# Patient Record
Sex: FEMALE | Race: BLACK OR AFRICAN AMERICAN | NOT HISPANIC OR LATINO | URBAN - METROPOLITAN AREA
[De-identification: names, ages, dates, MRNs, and addresses within clinical notes are randomized per-mention and may not be internally consistent; named-entity substitution may affect disease eponyms.]

---

## 2021-11-16 ENCOUNTER — LAB VISIT (OUTPATIENT)
Dept: LAB | Facility: OTHER | Age: 51
End: 2021-11-16
Attending: INTERNAL MEDICINE
Payer: MEDICAID

## 2021-11-16 DIAGNOSIS — Z80.0 FAMILY HISTORY OF COLON CANCER: ICD-10-CM

## 2021-11-16 DIAGNOSIS — R10.11 ABDOMINAL PAIN, RIGHT UPPER QUADRANT: Primary | ICD-10-CM

## 2021-11-16 DIAGNOSIS — Z12.11 SPECIAL SCREENING FOR MALIGNANT NEOPLASMS, COLON: ICD-10-CM

## 2021-11-16 LAB
ALBUMIN SERPL BCP-MCNC: 3.7 G/DL (ref 3.5–5.2)
ALP SERPL-CCNC: 90 U/L (ref 55–135)
ALT SERPL W/O P-5'-P-CCNC: 21 U/L (ref 10–44)
AMYLASE SERPL-CCNC: 61 U/L (ref 20–110)
ANION GAP SERPL CALC-SCNC: 6 MMOL/L (ref 8–16)
AST SERPL-CCNC: 24 U/L (ref 10–40)
BASOPHILS # BLD AUTO: 0.07 K/UL (ref 0–0.2)
BASOPHILS NFR BLD: 0.7 % (ref 0–1.9)
BILIRUB SERPL-MCNC: 0.5 MG/DL (ref 0.1–1)
BUN SERPL-MCNC: 12 MG/DL (ref 6–20)
CALCIUM SERPL-MCNC: 9.3 MG/DL (ref 8.7–10.5)
CHLORIDE SERPL-SCNC: 108 MMOL/L (ref 95–110)
CO2 SERPL-SCNC: 28 MMOL/L (ref 23–29)
CREAT SERPL-MCNC: 0.8 MG/DL (ref 0.5–1.4)
DIFFERENTIAL METHOD: ABNORMAL
EOSINOPHIL # BLD AUTO: 0.4 K/UL (ref 0–0.5)
EOSINOPHIL NFR BLD: 4 % (ref 0–8)
ERYTHROCYTE [DISTWIDTH] IN BLOOD BY AUTOMATED COUNT: 11.9 % (ref 11.5–14.5)
EST. GFR  (AFRICAN AMERICAN): >60 ML/MIN/1.73 M^2
EST. GFR  (NON AFRICAN AMERICAN): >60 ML/MIN/1.73 M^2
GLUCOSE SERPL-MCNC: 94 MG/DL (ref 70–110)
HCT VFR BLD AUTO: 42.1 % (ref 37–48.5)
HGB BLD-MCNC: 13.5 G/DL (ref 12–16)
IMM GRANULOCYTES # BLD AUTO: 0.02 K/UL (ref 0–0.04)
IMM GRANULOCYTES NFR BLD AUTO: 0.2 % (ref 0–0.5)
LIPASE SERPL-CCNC: 25 U/L (ref 4–60)
LYMPHOCYTES # BLD AUTO: 3.8 K/UL (ref 1–4.8)
LYMPHOCYTES NFR BLD: 37.1 % (ref 18–48)
MCH RBC QN AUTO: 31 PG (ref 27–31)
MCHC RBC AUTO-ENTMCNC: 32.1 G/DL (ref 32–36)
MCV RBC AUTO: 97 FL (ref 82–98)
MONOCYTES # BLD AUTO: 1.1 K/UL (ref 0.3–1)
MONOCYTES NFR BLD: 11.2 % (ref 4–15)
NEUTROPHILS # BLD AUTO: 4.8 K/UL (ref 1.8–7.7)
NEUTROPHILS NFR BLD: 46.8 % (ref 38–73)
NRBC BLD-RTO: 0 /100 WBC
PLATELET # BLD AUTO: 302 K/UL (ref 150–450)
PMV BLD AUTO: 9.7 FL (ref 9.2–12.9)
POTASSIUM SERPL-SCNC: 4.1 MMOL/L (ref 3.5–5.1)
PROT SERPL-MCNC: 6.8 G/DL (ref 6–8.4)
RBC # BLD AUTO: 4.35 M/UL (ref 4–5.4)
SODIUM SERPL-SCNC: 142 MMOL/L (ref 136–145)
WBC # BLD AUTO: 10.2 K/UL (ref 3.9–12.7)

## 2021-11-16 PROCEDURE — 36415 COLL VENOUS BLD VENIPUNCTURE: CPT | Performed by: INTERNAL MEDICINE

## 2021-11-16 PROCEDURE — 83690 ASSAY OF LIPASE: CPT | Performed by: INTERNAL MEDICINE

## 2021-11-16 PROCEDURE — 85025 COMPLETE CBC W/AUTO DIFF WBC: CPT | Performed by: INTERNAL MEDICINE

## 2021-11-16 PROCEDURE — 80053 COMPREHEN METABOLIC PANEL: CPT | Performed by: INTERNAL MEDICINE

## 2021-11-16 PROCEDURE — 82150 ASSAY OF AMYLASE: CPT | Performed by: INTERNAL MEDICINE

## 2022-12-16 ENCOUNTER — HOSPITAL ENCOUNTER (EMERGENCY)
Facility: OTHER | Age: 52
Discharge: HOME | End: 2022-12-16
Attending: EMERGENCY MEDICINE
Payer: MEDICAID

## 2022-12-16 VITALS
BODY MASS INDEX: 33.59 KG/M2 | HEIGHT: 66 IN | WEIGHT: 209 LBS | RESPIRATION RATE: 18 BRPM | SYSTOLIC BLOOD PRESSURE: 170 MMHG | HEART RATE: 50 BPM | TEMPERATURE: 98 F | DIASTOLIC BLOOD PRESSURE: 80 MMHG | OXYGEN SATURATION: 100 %

## 2022-12-16 DIAGNOSIS — R51.9 NONINTRACTABLE HEADACHE, UNSPECIFIED CHRONICITY PATTERN, UNSPECIFIED HEADACHE TYPE: ICD-10-CM
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
## 2022-12-16 DIAGNOSIS — S16.1XXA CERVICAL STRAIN, ACUTE, INITIAL ENCOUNTER: ICD-10-CM

## 2022-12-16 PROCEDURE — 99284 EMERGENCY DEPT VISIT MOD MDM: CPT

## 2022-12-16 PROCEDURE — 63600175 PHARM REV CODE 636 W HCPCS: Performed by: EMERGENCY MEDICINE

## 2022-12-16 PROCEDURE — 96372 THER/PROPH/DIAG INJ SC/IM: CPT | Performed by: EMERGENCY MEDICINE

## 2022-12-16 RX ORDER — ORPHENADRINE CITRATE 30 MG/ML
60 INJECTION INTRAMUSCULAR; INTRAVENOUS
Status: COMPLETED | OUTPATIENT
Start: 2022-12-16 | End: 2022-12-16

## 2022-12-16 RX ORDER — IBUPROFEN 800 MG/1
800 TABLET ORAL EVERY 6 HOURS PRN
Qty: 30 TABLET | Refills: 0 | Status: SHIPPED | OUTPATIENT
Start: 2022-12-16

## 2022-12-16 RX ORDER — KETOROLAC TROMETHAMINE 30 MG/ML
30 INJECTION, SOLUTION INTRAMUSCULAR; INTRAVENOUS
Status: COMPLETED | OUTPATIENT
Start: 2022-12-16 | End: 2022-12-16

## 2022-12-16 RX ORDER — CYCLOBENZAPRINE HCL 10 MG
10 TABLET ORAL 3 TIMES DAILY PRN
Qty: 20 TABLET | Refills: 0 | Status: SHIPPED | OUTPATIENT
Start: 2022-12-16 | End: 2022-12-26

## 2022-12-16 RX ADMIN — ORPHENADRINE CITRATE 60 MG: 30 INJECTION INTRAMUSCULAR; INTRAVENOUS at 01:12

## 2022-12-16 RX ADMIN — KETOROLAC TROMETHAMINE 30 MG: 30 INJECTION, SOLUTION INTRAMUSCULAR; INTRAVENOUS at 01:12
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
## 2023-10-25 ENCOUNTER — HOSPITAL ENCOUNTER (EMERGENCY)
Facility: OTHER | Age: 53
Discharge: HOME | End: 2023-10-25
Attending: EMERGENCY MEDICINE
Payer: MEDICAID

## 2023-10-25 VITALS
OXYGEN SATURATION: 99 % | TEMPERATURE: 98 F | RESPIRATION RATE: 18 BRPM | WEIGHT: 211 LBS | HEIGHT: 66 IN | HEART RATE: 50 BPM | BODY MASS INDEX: 33.91 KG/M2 | DIASTOLIC BLOOD PRESSURE: 83 MMHG | SYSTOLIC BLOOD PRESSURE: 173 MMHG

## 2023-10-25 DIAGNOSIS — S80.12XA CONTUSION OF LEFT LOWER EXTREMITY, INITIAL ENCOUNTER: ICD-10-CM

## 2023-10-25 DIAGNOSIS — S80.11XA CONTUSION OF RIGHT LOWER EXTREMITY, INITIAL ENCOUNTER: ICD-10-CM

## 2023-10-25 DIAGNOSIS — T14.90XA INJURY: ICD-10-CM
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
## 2023-10-25 DIAGNOSIS — S46.811A TRAPEZIUS MUSCLE STRAIN, RIGHT, INITIAL ENCOUNTER: ICD-10-CM

## 2023-10-25 PROCEDURE — 25000003 PHARM REV CODE 250

## 2023-10-25 PROCEDURE — 99284 EMERGENCY DEPT VISIT MOD MDM: CPT

## 2023-10-25 RX ORDER — HYDROCODONE BITARTRATE AND ACETAMINOPHEN 5; 325 MG/1; MG/1
1 TABLET ORAL
Status: COMPLETED | OUTPATIENT
Start: 2023-10-25 | End: 2023-10-25

## 2023-10-25 RX ORDER — HYDROCODONE BITARTRATE AND ACETAMINOPHEN 5; 325 MG/1; MG/1
1 TABLET ORAL EVERY 6 HOURS PRN
Qty: 8 TABLET | Refills: 0 | Status: SHIPPED | OUTPATIENT
Start: 2023-10-25

## 2023-10-25 RX ADMIN — HYDROCODONE BITARTRATE AND ACETAMINOPHEN 1 TABLET: 5; 325 TABLET ORAL at 02:10

## 2023-10-25 NOTE — DISCHARGE INSTRUCTIONS
You were seen in the ER for evaluation of extremity pain after fall that occurred at work.  Your x-rays were negative for any fractures.  I believe that you likely have some bruising along with a muscle strain of your right trapezius muscle from the fall. Place ice on the areas for 20 minutes at a time, elevated your legs, and take tylenol for pain. I am prescribing you Norco that you may take as needed for breakthrough pain, please do not take this before working or driving.  Please follow up with your PCP within 1-2 days.  Return to the ER for any new or worsening symptoms.

## 2023-10-25 NOTE — ED TRIAGE NOTES
Tripped and fell on Monday. States she has been taking Tylenol for pain but pain to both elbows and right knee have persisted. Also c/o pain to left posterior calf. Patient is currently on Eloquis. Ambulates with pain and generalized body aches.

## 2023-10-25 NOTE — FIRST PROVIDER EVALUATION
Emergency Department TeleTriage Encounter Note      CHIEF COMPLAINT    Chief Complaint   Patient presents with    Fall     Pt presents to the ER with complaints of generalized body aches with pain in the right knee, left shin, and both elbows after falling while carrying crates on Monday. Pt is on Eliquis; denies hitting head; denies LOC.         VITAL SIGNS   Initial Vitals [10/25/23 1114]   BP Pulse Resp Temp SpO2   (!) 142/67 65 18 98.3 °F (36.8 °C) 98 %      MAP       --            ALLERGIES    Review of patient's allergies indicates:   Allergen Reactions    Pcn [penicillins] Hives    Percocet [oxycodone-acetaminophen] Hives and Itching       PROVIDER TRIAGE NOTE  Patient presents with complaint of but bilateral elbow pain and right knee pain after a fall 2 days ago.      Phy:   Constitutional: well nourished, well developed, appearing stated age, NAD        Initial orders will be placed and care will be transferred to an alternate provider when patient is roomed for a full evaluation. Any additional orders and the final disposition will be determined by that provider.        ORDERS  Labs Reviewed - No data to display    ED Orders (720h ago, onward)      None              Virtual Visit Note: The provider triage portion of this emergency department evaluation and documentation was performed via [employer], a HIPAA-compliant telemedicine application, in concert with a tele-presenter in the room. A face to face patient evaluation with one of my colleagues will occur once the patient is placed in an emergency department room.      DISCLAIMER: This note was prepared with [employer] voice recognition transcription software. Garbled syntax, mangled pronouns, and other bizarre constructions may be attributed to that software system.

## 2023-10-25 NOTE — Clinical Note
"Lia"Elin Amaro was seen and treated in our emergency department on 10/25/2023.  She may return to work on 10/27/2023.       If you have any questions or concerns, please don't hesitate to call.      Mariella Kirkland PA-C"

## 2023-10-26 NOTE — ED PROVIDER NOTES
Source of History:  Patient    Chief complaint:  Fall (Pt presents to the ER with complaints of generalized body aches with pain in the right knee, left shin, and both elbows after falling while carrying crates on Monday. Pt is on Eliquis; denies hitting head; denies LOC./)      HPI:  Lia Amaro is a 53 y.o. female presenting with  right knee, left shin, and bilateral elbow pain after fall 2 days ago.  Patient states that she fell while she was at work 2 days ago.  States that she was carrying a crate outside when she fell and hit her knees and elbows.  States that she did not hit her head, did not lose consciousness.  States that she remembers the fall, is unsure what caused her to fall, no syncope.  She states that she was able to get up after the fall and ambulate.  States that she is been able to ambulate with pain for the past 2 days.  States that she did not have a ride here to be evaluated earlier.  She reports pain in her left shin, right knee, and bilateral elbows.  Has been taking Tylenol and Norco for pain.  Reports that she had a recent MI, is currently on Eliquis.  Denies any headaches, visual changes, vomiting since the fall.  Denies any chest pain, shortness of breath, abdominal pain.    This is the extent to the patients complaints today here in the emergency department.    ROS: As per HPI and below:  Constitutional: No fever.  No chills.  Eyes: No visual changes.   ENT: No sore throat. No ear pain.  Urinary: No abnormal urination.  MSK: Positive for right knee, left shin, bilateral elbows  Integument: No rashes or lesions.    Review of patient's allergies indicates:   Allergen Reactions    Pcn [penicillins] Hives    Percocet [oxycodone-acetaminophen] Hives and Itching       PMH:  As per HPI and below:  Past Medical History:   Diagnosis Date    Fibromyalgia      No past surgical history on file.    Social History     Tobacco Use    Smoking status: Never    Smokeless tobacco: Never   Substance  "Use Topics    Drug use: Never       Physical Exam:    BP (!) 173/83   Pulse (!) 50   Temp 97.7 °F (36.5 °C) (Oral)   Resp 18   Ht 5' 6" (1.676 m)   Wt 95.7 kg (211 lb)   SpO2 99%   BMI 34.06 kg/m²   Nursing note and vital signs reviewed.  Constitutional: No acute distress. Well appearing, speaking in full sentences.  Eyes: No conjunctival injection.  Extraocular muscles are intact.  ENT: Normal phonation.  Chest: No respiratory distress.   Musculoskeletal:  Tenderness to palpation at the anterior right knee. No significant effusion, overlying warmth or erythema. Mild pain with passive flexion. Tenderness at the left shin, no tenderness to the knee, no pain with ROM of knee. Bilateral elbows with no swelling, tenderness to palpation, full active range of motion intact. No bony tenderness to remainders of extremities. No tenderness, crepitus at midline cervical spine, some tenderness noted at right trapezius. Distal extremities neurovascularly intact. 2+ DP and radial pulses bilaterally. Able to ambulate.   Skin: No rashes seen.  Good turgor.  No abrasions.  No ecchymoses.  Psych: Appropriate, conversant.        I decided to obtain the patient's medical records.    Imaging Results              X-Ray Tibia Fibula 2 View Left (Final result)  Result time 10/25/23 15:34:58      Final result by Ryann Cam MD (10/25/23 15:34:58)                   Impression:      No acute osseous abnormality seen.      Electronically signed by: Ryann Cam  Date:    10/25/2023  Time:    15:34               Narrative:    EXAMINATION:  XR TIBIA FIBULA 2 VIEW LEFT    CLINICAL HISTORY:  Unspecified fall, initial encounter    TECHNIQUE:  AP and lateral views of the left tibia and fibula were performed.    COMPARISON:  None.    FINDINGS:  No acute fracture seen.  Plantar calcaneal spur.    Soft tissue edema lateral leg.                                       X-Ray Elbow Complete Bilateral (Final result)  Result time 10/25/23 " 13:03:38      Final result by Ryann Cam MD (10/25/23 13:03:38)                   Impression:      No acute osseous abnormality seen.      Electronically signed by: Ryann Cam  Date:    10/25/2023  Time:    13:03               Narrative:    EXAMINATION:  XR ELBOW COMPLETE 3 VIEW BILATERAL    TECHNIQUE:  AP, lateral, and oblique views of bilateral elbow were performed.    COMPARISON:  None    FINDINGS:  No acute fracture or dislocation seen.  Soft tissues are unremarkable with no significant joint effusion.  I see no radiopaque retained foreign body.                                       X-Ray Knee 1 or 2 View Right (Final result)  Result time 10/25/23 13:03:14   Procedure changed from X-Ray Knee 3 View Right     Final result by Ryann Cam MD (10/25/23 13:03:14)                   Impression:      No acute osseous abnormality seen.      Electronically signed by: Ryann Cam  Date:    10/25/2023  Time:    13:03               Narrative:    EXAMINATION:  XR KNEE 1 OR 2 VIEW RIGHT    CLINICAL HISTORY:  injury;  Injury, unspecified, initial encounter    TECHNIQUE:  AP and lateral views of the right knee were performed.    COMPARISON:  None    FINDINGS:  I see no acute fracture.  Mild osteophyte formation without significant joint space narrowing.    No significant suprapatellar joint effusion or soft tissue edema.                                      MDM:    53 y.o. female with past medical history of fibromyalgia, recent MI earlier this year on Eliquis, presenting for evaluation of leg and arm pain after mechanical fall yesterday.  Afebrile and hemodynamically stable.  Fracture unlikely given patient with active range of motion of bilateral arms intact, able to ambulate on bilateral lower extremities.  However given fall with continued pain, we will obtain x-rays.  Norco given for pain. X-ray left tib-fib, right knee, bilateral elbows with no acute fracture or dislocation.  Patient with recent MI,  therefore we will avoid anti-inflammatories.  We will discharge with recommendation for Tylenol, Norco for breakthrough pain.  Recommend patient follow up with her PCP in 1-2 days.  Return precautions given. I see no indication of an emergent process beyond that addressed during our encounter but have duly counseled the patient/family regarding the need for prompt follow-up as well as the indications that should prompt immediate return to the emergency room should new or worrisome developments occur. I discussed the ED work up and diagnostic findings with the patient. The patient/family has been provided with verbal and printed direction regarding our final diagnosis(es) as well as instructions regarding use of OTC and/or Rx medications intended to manage the patient's aforementioned conditions. The patient/family verbalized an understanding. The patient/family is asked if there are any questions or concerns. We discuss the case, until all issues are addressed to the patient/family's satisfaction. Patient/family understands and is agreeable to the plan. I discussed this case with my attending, Dr. Kennedy, who was in agreement with plan.                  Diagnostic Impression:    1. Fall    2. Injury    3. Contusion of left lower extremity, initial encounter    4. Contusion of right lower extremity, initial encounter    5. Trapezius muscle strain, right, initial encounter         ED Disposition Condition    Discharge Stable            ED Prescriptions       Medication Sig Dispense Start Date End Date Auth. Provider    HYDROcodone-acetaminophen (NORCO) 5-325 mg per tablet Take 1 tablet by mouth every 6 (six) hours as needed for Pain. 8 tablet 10/25/2023 -- Mariella Kirkland PA-C          Follow-up Information       Follow up With Specialties Details Why Contact Info    Your PCP  Schedule an appointment as soon as possible for a visit in 2 days      [facility] Emergency Dept Emergency Medicine Go to  If symptoms worsen  [address]  [facility] [address]  [phone]            Please pardon typos or dictation errors, as this note was transcribed using [employer] direct dictation software.      Mariella Kirkland PA-C  10/25/23 2007

## 2023-12-28 DIAGNOSIS — M25.562 PATELLOFEMORAL INSTABILITY OF BOTH KNEES WITH PAIN: Primary | ICD-10-CM

## 2023-12-28 DIAGNOSIS — M25.362 PATELLOFEMORAL INSTABILITY OF BOTH KNEES WITH PAIN: Primary | ICD-10-CM

## 2023-12-28 DIAGNOSIS — M25.561 PATELLOFEMORAL INSTABILITY OF BOTH KNEES WITH PAIN: Primary | ICD-10-CM

## 2023-12-28 DIAGNOSIS — M25.361 PATELLOFEMORAL INSTABILITY OF BOTH KNEES WITH PAIN: Primary | ICD-10-CM

## 2024-01-17 ENCOUNTER — OFFICE VISIT (OUTPATIENT)
Dept: ORTHOPEDICS | Facility: CLINIC | Age: 54
End: 2024-01-17
Payer: MEDICAID

## 2024-01-17 VITALS
BODY MASS INDEX: 33.04 KG/M2 | HEIGHT: 66 IN | SYSTOLIC BLOOD PRESSURE: 140 MMHG | WEIGHT: 205.56 LBS | HEART RATE: 46 BPM | DIASTOLIC BLOOD PRESSURE: 86 MMHG

## 2024-01-17 DIAGNOSIS — M17.11 PRIMARY OSTEOARTHRITIS OF RIGHT KNEE: Primary | ICD-10-CM

## 2024-01-17 DIAGNOSIS — M17.12 PRIMARY OSTEOARTHRITIS OF LEFT KNEE: ICD-10-CM

## 2024-01-17 PROCEDURE — 99203 OFFICE O/P NEW LOW 30 MIN: CPT | Mod: S$PBB,,,

## 2024-01-17 PROCEDURE — 3077F SYST BP >= 140 MM HG: CPT | Mod: CPTII,,,

## 2024-01-17 PROCEDURE — 99999 PR PBB SHADOW E&M-EST. PATIENT-LVL III: CPT | Mod: PBBFAC,,,

## 2024-01-17 PROCEDURE — 3008F BODY MASS INDEX DOCD: CPT | Mod: CPTII,,,

## 2024-01-17 PROCEDURE — 99213 OFFICE O/P EST LOW 20 MIN: CPT | Mod: PBBFAC,PN

## 2024-01-17 PROCEDURE — 1159F MED LIST DOCD IN RCRD: CPT | Mod: CPTII,,,

## 2024-01-17 PROCEDURE — 4010F ACE/ARB THERAPY RXD/TAKEN: CPT | Mod: CPTII,,,

## 2024-01-17 PROCEDURE — 3079F DIAST BP 80-89 MM HG: CPT | Mod: CPTII,,,

## 2024-01-17 RX ORDER — SERTRALINE HYDROCHLORIDE 50 MG/1
1 TABLET, FILM COATED ORAL DAILY
COMMUNITY

## 2024-01-17 RX ORDER — SPIRONOLACTONE 25 MG/1
1 TABLET ORAL DAILY
COMMUNITY

## 2024-01-17 RX ORDER — FLUTICASONE PROPIONATE 50 MCG
2 SPRAY, SUSPENSION (ML) NASAL DAILY
COMMUNITY

## 2024-01-17 RX ORDER — APIXABAN 5 MG/1
1 TABLET, FILM COATED ORAL 2 TIMES DAILY
COMMUNITY
Start: 2023-11-02

## 2024-01-17 RX ORDER — METOPROLOL SUCCINATE 25 MG/1
TABLET, EXTENDED RELEASE ORAL
COMMUNITY
Start: 2023-11-02

## 2024-01-17 RX ORDER — LOSARTAN POTASSIUM 25 MG/1
1 TABLET ORAL DAILY
COMMUNITY
Start: 2023-11-02

## 2024-01-17 RX ORDER — PREGABALIN 25 MG/1
CAPSULE ORAL
COMMUNITY

## 2024-01-17 RX ORDER — AMITRIPTYLINE HYDROCHLORIDE 10 MG/1
TABLET, FILM COATED ORAL
COMMUNITY

## 2024-01-17 RX ORDER — AMMONIUM LACTATE 12 G/100G
CREAM TOPICAL
COMMUNITY

## 2024-01-17 NOTE — PROGRESS NOTES
Patient ID: Lia Amaro is a 53 y.o. female    Pain of the Left Knee and Pain of the Right Knee      History of Present Illness:    Lia Amaro presents to clinic for bilateral knee pain, R > L. Patient reports in may 2023 she fell on concrete steps and had a left leg hematoma, she is on eliquis. She then fell again in October and this hurt her right knee further. The pain started 8 months ago and is becoming progressively worse.  Pain is located over (points to) medial right knee and anterior left knee. She reports that the pain is a 5 /10 sharp and intermittent pain toda. The pain is affecting ADLs and limiting desired level of activity. Denies numbness, tingling, radiation and inability to bear weight.     She has tried norco, anti-inflammatories, and tylenol for pain. Reports she did have CSIs years ago to her bilateral knees, thinks last injection was before johnny.     Mechanical symptoms: -   Instability: + right    Occupation: airport employee     Ambulating: unassisted  Diabetic: no  Smoking: past, quit 8 yrs ago  Hx of DVT/PE: no  On eliquis     PAST MEDICAL HISTORY:   Past Medical History:   Diagnosis Date    Fibromyalgia      PAST SURGICAL HISTORY: History reviewed. No pertinent surgical history.  FAMILY HISTORY: History reviewed. No pertinent family history.  SOCIAL HISTORY:   Social History     Occupational History    Not on file   Tobacco Use    Smoking status: Never    Smokeless tobacco: Never   Substance and Sexual Activity    Alcohol use: Not on file    Drug use: Never    Sexual activity: Not on file        MEDICATIONS:   Current Outpatient Medications:     amitriptyline (ELAVIL) 10 MG tablet, TAKE 1 TABLET BY MOUTH EVERY DAY AT BEDTIME FOR 30 DAYS, Disp: , Rfl:     ammonium lactate 12 % Crea, , Disp: , Rfl:     ELIQUIS 5 mg Tab, Take 1 tablet by mouth 2 (two) times daily., Disp: , Rfl:     fluticasone propionate (FLONASE) 50 mcg/actuation nasal spray, 2 sprays by Each Nostril route once  daily., Disp: , Rfl:     HYDROcodone-acetaminophen (NORCO) 5-325 mg per tablet, Take 1 tablet by mouth every 6 (six) hours as needed for Pain., Disp: 8 tablet, Rfl: 0    ibuprofen (ADVIL,MOTRIN) 800 MG tablet, Take 1 tablet (800 mg total) by mouth every 6 (six) hours as needed for Pain., Disp: 30 tablet, Rfl: 0    losartan (COZAAR) 25 MG tablet, Take 1 tablet by mouth once daily., Disp: , Rfl:     metoprolol succinate (TOPROL-XL) 25 MG 24 hr tablet, TAKE 1/2 (ONE-HALF) TABLET BY MOUTH ONCE DAILY FOR HEART FUNCTION, Disp: , Rfl:     pregabalin (LYRICA) 25 MG capsule, TAKE 1 CAPSULE BY MOUTH ONCE DAILY AS NEEDED FOR PAIN, Disp: , Rfl:     sertraline (ZOLOFT) 50 MG tablet, Take 1 tablet by mouth once daily., Disp: , Rfl:     spironolactone (ALDACTONE) 25 MG tablet, Take 1 tablet by mouth once daily., Disp: , Rfl:   ALLERGIES:   Review of patient's allergies indicates:   Allergen Reactions    Pcn [penicillins] Hives    Percocet [oxycodone-acetaminophen] Hives and Itching         Physical Exam     Vitals:    01/17/24 0800   BP: (!) 140/86   Pulse: (!) 46     Alert and oriented to person, place and time. No acute distress. Well-groomed, not ill appearing. Pupils round and reactive, normal respiratory effort, no audible wheezing.     OBSERVATION / INSPECTION   Gait:   Nonantalgic   Alignment:  Neutral   Scars:   None   Muscle atrophy: None  Effusion:  None   Warmth:  None   Discoloration:   None     TENDERNESS                 Patella     Negative    Peripatellar medial    Negative   Peripatellar lateral   Negative   Patellar tendon   Negative   Quad tendon     Negative    Prepatellar Bursa   Negative     Tibial tubercle    Negative    Pes anserine/HS   Negative      ITB     Negative      LCL     Negative  MFC     Negative  LFC     Negative  MCL      Negative  Medial Joint Line    Negative   Lateral Joint Line   Negative  Quadriceps    Negative  Hamstring     Negative            Range of  Motion:        Left knee:   0-130°  *  Right knee:    0-140°      Patellofemoral examination:     Patella position    Centered  Crepitus (PF):    Absent   Lateral tilt:    Normal   J-sign:     None   Patellofemoral grind:   +      MENISCAL SIGNS:     Pain on terminal extension:  Negative  Pain on terminal flexion:  Negative  Aminahs maneuver:  Negative     LIGAMENT EXAMINATION:  ACL / Lachman:  normal   PCL-Post.  drawer: normal 0 to 2mm  MCL- Valgus:  normal 0 to 2mm  LCL- Varus:    normal 0 to 2mm    Dial Test: difference c/w other side  At 30° flexion: normal (< 5°)   At 90° flexion: normal (< 5°)       STRENGTH: (* = with pain)   Quadricep   5/5  Hamstrin/5    EXTREMITY NEURO-VASCULAR EXAMINATION:   Sensation:  Grossly intact to light touch all dermatomal regions.   Motor Function:  Fully intact motor function at hip, knee, foot and ankle    DTRs;  quadriceps and  achilles 2+.  No clonus and downgoing Babinski.    Vascular status:  DP and PT pulses 2+, brisk capillary refill, symmetric.     Imaging:     Bilateral knee X-rays ordered/reviewed by me showing no evidence of fracture or dislocation. There is no obvious malalignment. No evidence of masses, lesions or foreign bodies. Mild medial compartment narrowing bilaterally with right medial spurring. 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 Endochondroma to right distal femur, similar to previous exam.     Assessment & Plan    Primary osteoarthritis of right knee  -     Prior authorization Order    Primary osteoarthritis of left knee  -     Prior authorization Order         I made the decision to obtain old records of the patient including previous notes and imaging. New imaging was ordered today of the extremity or extremities evaluated. I independently reviewed and interpreted the radiographs and/or MRIs/CT scan today as well as prior imaging.    We discussed at length different treatment options including conservative vs surgical management. These include anti-inflammatories, acetaminophen,  rest, ice, heat, formal physical therapy including strengthening and stretching exercises, home exercise programs, injections, dry needling, and finally surgical intervention.      Medical Necessity for viscosupplementation use: After thorough evaluation of the patient, I have determined that viscosupplementation treatment is medically necessary. The patient has painful degenerative joint disease (DJD) of the knee(s) with failure of conservative treatments including lifestyle modifications and rehabilitation exercises. Oral analgesics including NSAIDs have not adequately controlled the patient's symptoms. There is radiographic evidence of Kellgren-Benny grade II (or greater) osteoarthritic (OA) changes, or if lack of radiographic evidence, there is arthroscopic or other evidence of chondrosis of the knee(s).     Pre-authorization placed for Euflexxa series bilateral injections.  Ice compress to the affected area 2-3x a day for 15-20 minutes as needed for pain management  Tylenol and voltaren PRN for pain management.   RTC to see Grace welch PA-C for Euflexxa series injections.    Follow up: euflexxa  X-rays next visit: none    All questions were answered and patient is agreeable to the above plan.

## 2024-01-19 DIAGNOSIS — M17.11 PRIMARY OSTEOARTHRITIS OF RIGHT KNEE: Primary | ICD-10-CM

## 2024-01-19 DIAGNOSIS — M17.12 PRIMARY OSTEOARTHRITIS OF LEFT KNEE: ICD-10-CM

## 2024-01-31 ENCOUNTER — TELEPHONE (OUTPATIENT)
Dept: ORTHOPEDICS | Facility: CLINIC | Age: 54
End: 2024-01-31
Payer: MEDICAID

## 2024-01-31 NOTE — TELEPHONE ENCOUNTER
----- Message from Rachele Terry sent at 1/31/2024 12:24 PM CST -----  Consult/Advisory    Name Of Caller:Lia       Contact Preference:[phone]    Nature of call: Ptn returning a missed call

## 2024-01-31 NOTE — TELEPHONE ENCOUNTER
----- Message from Jazmine Valenzuela LPN sent at 1/31/2024  9:43 AM CST -----  Regarding: FW: Change appt time  Contact: Lia    ----- Message -----  From: Ashley Hartley  Sent: 1/31/2024   9:21 AM CST  To: Brayden Marr  Subject: Change appt time                                      Caller: Lia      Contact: [phone] (home)       Calling to change appt time  for 02/02/2024, because she doesn't get off until 1 pm. Please advise. Requesting a call

## 2024-02-02 ENCOUNTER — OFFICE VISIT (OUTPATIENT)
Dept: ORTHOPEDICS | Facility: CLINIC | Age: 54
End: 2024-02-02
Payer: MEDICAID

## 2024-02-02 VITALS
WEIGHT: 209.44 LBS | SYSTOLIC BLOOD PRESSURE: 139 MMHG | BODY MASS INDEX: 33.66 KG/M2 | HEART RATE: 48 BPM | HEIGHT: 66 IN | DIASTOLIC BLOOD PRESSURE: 72 MMHG

## 2024-02-02 DIAGNOSIS — M17.11 PRIMARY OSTEOARTHRITIS OF RIGHT KNEE: Primary | ICD-10-CM

## 2024-02-02 DIAGNOSIS — M17.12 PRIMARY OSTEOARTHRITIS OF LEFT KNEE: ICD-10-CM

## 2024-02-02 PROCEDURE — 99999 PR PBB SHADOW E&M-EST. PATIENT-LVL III: CPT | Mod: PBBFAC,,,

## 2024-02-02 PROCEDURE — 99999PBSHW PR PBB SHADOW TECHNICAL ONLY FILED TO HB: Mod: JZ,PBBFAC,,

## 2024-02-02 PROCEDURE — 20610 DRAIN/INJ JOINT/BURSA W/O US: CPT | Mod: PBBFAC,PN

## 2024-02-02 PROCEDURE — 99499 UNLISTED E&M SERVICE: CPT | Mod: S$PBB,,,

## 2024-02-02 PROCEDURE — 99213 OFFICE O/P EST LOW 20 MIN: CPT | Mod: PBBFAC,PN,25

## 2024-02-02 PROCEDURE — 20610 DRAIN/INJ JOINT/BURSA W/O US: CPT | Mod: S$PBB,50,,

## 2024-02-02 RX ADMIN — Medication 30 MG: at 02:02

## 2024-02-02 RX ADMIN — Medication 30 MG: at 09:02

## 2024-02-02 NOTE — PROGRESS NOTES
Lia Amaro is here for follow up of bilateral knee arthritis. Pt is requesting Orthovisc series injections #1 of 3.  [facility] Has failed other conservative modalities including NSAIDS, activity modification, weight loss.    The prior shot was tolerated well.    PHYSICAL EXAMINATION:     General: The patient is alert and oriented x 3. Mood is pleasant.   Observation of ears, eyes and nose reveals no gross abnormalities. No   labored breathing observed.     No signs of infection or adverse reaction to knee.    Medical Necessity for viscosupplementation use: After thorough evaluation of the patient, I have determined that viscosupplementation treatment is medically necessary. The patient has painful degenerative joint disease (DJD) of the knee(s) with failure of conservative treatments including lifestyle modifications and rehabilitation exercises. Oral analgesics including NSAIDs have not adequately controlled the patient's symptoms. There is radiographic evidence of Kellgren-Benny grade II (or greater) osteoarthritic (OA) changes, or if lack of radiographic evidence, there is arthroscopic or other evidence of chondrosis of the knee(s).     Injection Procedure  A time out was performed, including verification of patient ID, procedure, site and side, availability of information and equipment, review of safety issues, and agreement with consent, the procedure site was marked.    After time out was performed, the patient was prepped aseptically with chloraprep. A diagnostic and therapeutic injection of 2cc Orthovisc was given under sterile technique using a 22g x 1.5 needle from the anterolateral  aspect of the bilateral Knee Joint in the sitting position.      Lia Amaro had no adverse reactions to the medication. Pain decreased. She was instructed to apply ice to the joint for 20 minutes and avoid strenuous activities for 24-36 hours following the injection. She was warned of  possible blood sugar and/or blood pressure changes during that time. Following that time, she can resume regular activities.    She was reminded to call the clinic immediately for any adverse side effects as explained in clinic today.    RTC to see Grace Owen PA-C for Orthovisc series injections #2 of 3.    All of the patient's questions were answered and the patient will contact us if they have any questions or concerns in the interim.

## 2024-02-02 NOTE — PROCEDURES
Large Joint Aspiration/Injection: L knee    Date/Time: 2/2/2024 9:45 AM    Performed by: Grace Owen PA-C  Authorized by: Grace Owen PA-C    Consent Done?:  Yes (Verbal)  Indications:  Pain and arthritis  Site marked: the procedure site was marked    Timeout: prior to procedure the correct patient, procedure, and site was verified    Prep: patient was prepped and draped in usual sterile fashion    Local anesthetic:  Topical anesthetic    Details:  Needle Size:  22 G  Ultrasonic Guidance for needle placement?: No    Approach:  Anterolateral  Location:  Knee  Site:  L knee  Medications:  30 mg OLHS sodium hyaluronate (ORTHOVISC) 30 mg/2 mL IATC injection ([place] preferred)  Patient tolerance:  Patient tolerated the procedure well with no immediate complications

## 2024-02-02 NOTE — PROCEDURES
Large Joint Aspiration/Injection: R knee    Date/Time: 2/2/2024 9:45 AM    Performed by: Grace Owen PA-C  Authorized by: Grace Owen PA-C    Consent Done?:  Yes (Verbal)  Indications:  Pain and arthritis  Site marked: the procedure site was marked    Timeout: prior to procedure the correct patient, procedure, and site was verified    Prep: patient was prepped and draped in usual sterile fashion    Local anesthetic:  Topical anesthetic    Details:  Needle Size:  22 G  Ultrasonic Guidance for needle placement?: No    Approach:  Anterolateral  Location:  Knee  Site:  R knee  Medications:  30 mg OLHS sodium hyaluronate (ORTHOVISC) 30 mg/2 mL IATC injection ([place] preferred)  Patient tolerance:  Patient tolerated the procedure well with no immediate complications

## 2024-02-09 ENCOUNTER — OFFICE VISIT (OUTPATIENT)
Dept: ORTHOPEDICS | Facility: CLINIC | Age: 54
End: 2024-02-09
Payer: MEDICAID

## 2024-02-09 VITALS
HEART RATE: 67 BPM | WEIGHT: 211.44 LBS | DIASTOLIC BLOOD PRESSURE: 77 MMHG | BODY MASS INDEX: 33.98 KG/M2 | SYSTOLIC BLOOD PRESSURE: 124 MMHG | HEIGHT: 66 IN

## 2024-02-09 DIAGNOSIS — M17.11 PRIMARY OSTEOARTHRITIS OF RIGHT KNEE: Primary | ICD-10-CM

## 2024-02-09 DIAGNOSIS — M17.12 PRIMARY OSTEOARTHRITIS OF LEFT KNEE: ICD-10-CM

## 2024-02-09 PROCEDURE — 20610 DRAIN/INJ JOINT/BURSA W/O US: CPT | Mod: S$PBB,50,,

## 2024-02-09 PROCEDURE — 20610 DRAIN/INJ JOINT/BURSA W/O US: CPT | Mod: PBBFAC,PN

## 2024-02-09 PROCEDURE — 99999PBSHW PR PBB SHADOW TECHNICAL ONLY FILED TO HB: Mod: JZ,PBBFAC,,

## 2024-02-09 PROCEDURE — 99212 OFFICE O/P EST SF 10 MIN: CPT | Mod: PBBFAC,PN,25

## 2024-02-09 PROCEDURE — 99999 PR PBB SHADOW E&M-EST. PATIENT-LVL II: CPT | Mod: PBBFAC,,,

## 2024-02-09 PROCEDURE — 99499 UNLISTED E&M SERVICE: CPT | Mod: S$PBB,,,

## 2024-02-09 RX ADMIN — Medication 30 MG: at 09:02

## 2024-02-09 NOTE — PROGRESS NOTES
Lia Amaro is here for follow up of bilateral knee arthritis. Pt is requesting Orthovisc series injections #2 of 3.  [facility] Has failed other conservative modalities including NSAIDS, activity modification, weight loss.    The prior shot was tolerated well.    PHYSICAL EXAMINATION:     General: The patient is alert and oriented x 3. Mood is pleasant.   Observation of ears, eyes and nose reveals no gross abnormalities. No   labored breathing observed.     No signs of infection or adverse reaction to knee.    Medical Necessity for viscosupplementation use: After thorough evaluation of the patient, I have determined that viscosupplementation treatment is medically necessary. The patient has painful degenerative joint disease (DJD) of the knee(s) with failure of conservative treatments including lifestyle modifications and rehabilitation exercises. Oral analgesics including NSAIDs have not adequately controlled the patient's symptoms. There is radiographic evidence of Kellgren-Benny grade II (or greater) osteoarthritic (OA) changes, or if lack of radiographic evidence, there is arthroscopic or other evidence of chondrosis of the knee(s).     Injection Procedure  A time out was performed, including verification of patient ID, procedure, site and side, availability of information and equipment, review of safety issues, and agreement with consent, the procedure site was marked.    After time out was performed, the patient was prepped aseptically with chloraprep. A diagnostic and therapeutic injection of 2cc Orthovisc was given under sterile technique using a 22g x 1.5 needle from the anterolateral  aspect of the bilateral Knee Joint in the sitting position.      Lia Amaro had no adverse reactions to the medication. Pain decreased. She was instructed to apply ice to the joint for 20 minutes and avoid strenuous activities for 24-36 hours following the injection. She was warned of  possible blood sugar and/or blood pressure changes during that time. Following that time, she can resume regular activities.    She was reminded to call the clinic immediately for any adverse side effects as explained in clinic today.    RTC to see Grace Owen PA-C for Orthovisc series injections #3 of 3.    All of the patient's questions were answered and the patient will contact us if they have any questions or concerns in the interim.

## 2024-02-09 NOTE — PROCEDURES
Large Joint Aspiration/Injection: L knee    Date/Time: 2/9/2024 9:30 AM    Performed by: Grace Owen PA-C  Authorized by: Grace Owen PA-C    Consent Done?:  Yes (Verbal)  Indications:  Pain and arthritis  Site marked: the procedure site was marked    Timeout: prior to procedure the correct patient, procedure, and site was verified    Prep: patient was prepped and draped in usual sterile fashion    Local anesthetic:  Topical anesthetic    Details:  Needle Size:  22 G  Ultrasonic Guidance for needle placement?: No    Approach:  Anterolateral  Location:  Knee  Site:  L knee  Medications:  30 mg OLHS sodium hyaluronate (ORTHOVISC) 30 mg/2 mL IATC injection ([place] preferred)  Patient tolerance:  Patient tolerated the procedure well with no immediate complications

## 2024-02-09 NOTE — PROCEDURES
Large Joint Aspiration/Injection: R knee    Date/Time: 2/9/2024 9:30 AM    Performed by: Grace Owen PA-C  Authorized by: Grace Owen PA-C    Consent Done?:  Yes (Verbal)  Indications:  Pain and arthritis  Site marked: the procedure site was marked    Timeout: prior to procedure the correct patient, procedure, and site was verified    Prep: patient was prepped and draped in usual sterile fashion    Local anesthetic:  Topical anesthetic    Details:  Needle Size:  22 G  Ultrasonic Guidance for needle placement?: No    Approach:  Anterolateral  Location:  Knee  Site:  R knee  Medications:  30 mg OLHS sodium hyaluronate (ORTHOVISC) 30 mg/2 mL IATC injection ([place] formul[place] preferred)  Patient tolerance:  Patient tolerated the procedure well with no immediate complications

## 2024-02-16 ENCOUNTER — OFFICE VISIT (OUTPATIENT)
Dept: ORTHOPEDICS | Facility: CLINIC | Age: 54
End: 2024-02-16
Payer: MEDICAID

## 2024-02-16 ENCOUNTER — TELEPHONE (OUTPATIENT)
Dept: ORTHOPEDICS | Facility: CLINIC | Age: 54
End: 2024-02-16
Payer: MEDICAID

## 2024-02-16 VITALS
HEART RATE: 46 BPM | SYSTOLIC BLOOD PRESSURE: 146 MMHG | HEIGHT: 66 IN | DIASTOLIC BLOOD PRESSURE: 80 MMHG | BODY MASS INDEX: 33.98 KG/M2 | WEIGHT: 211.44 LBS

## 2024-02-16 DIAGNOSIS — M17.11 PRIMARY OSTEOARTHRITIS OF RIGHT KNEE: Primary | ICD-10-CM

## 2024-02-16 DIAGNOSIS — M17.12 PRIMARY OSTEOARTHRITIS OF LEFT KNEE: ICD-10-CM

## 2024-02-16 PROCEDURE — 20610 DRAIN/INJ JOINT/BURSA W/O US: CPT | Mod: S$PBB,50,,

## 2024-02-16 PROCEDURE — 99999PBSHW PR PBB SHADOW TECHNICAL ONLY FILED TO HB: Mod: JZ,PBBFAC,,

## 2024-02-16 PROCEDURE — 20610 DRAIN/INJ JOINT/BURSA W/O US: CPT | Mod: PBBFAC,PN

## 2024-02-16 PROCEDURE — 99499 UNLISTED E&M SERVICE: CPT | Mod: S$PBB,,,

## 2024-02-16 PROCEDURE — 99999 PR PBB SHADOW E&M-EST. PATIENT-LVL III: CPT | Mod: PBBFAC,,,

## 2024-02-16 PROCEDURE — 99213 OFFICE O/P EST LOW 20 MIN: CPT | Mod: PBBFAC,PN

## 2024-02-16 RX ADMIN — Medication 30 MG: at 03:02

## 2024-02-16 RX ADMIN — Medication 30 MG: at 02:02

## 2024-02-16 NOTE — TELEPHONE ENCOUNTER
----- Message from Anahi Lin sent at 2/16/2024  2:03 PM CST -----  Regarding: GIANFRANCO DELAROSA  Contact: [phone]  Calling to inform you that patient will be running late to his or her appointment maybe 20 minutes. Please call patient if she cannot be seen today.

## 2024-02-16 NOTE — PROGRESS NOTES
Lia Amaro is here for follow up of bilateral knee arthritis. Pt is requesting Orthovisc series injections #3 of 3.  [facility] Has failed other conservative modalities including NSAIDS, activity modification, weight loss.    The prior shot was tolerated well.    PHYSICAL EXAMINATION:     General: The patient is alert and oriented x 3. Mood is pleasant.   Observation of ears, eyes and nose reveals no gross abnormalities. No   labored breathing observed.     No signs of infection or adverse reaction to knee.    Medical Necessity for viscosupplementation use: After thorough evaluation of the patient, I have determined that viscosupplementation treatment is medically necessary. The patient has painful degenerative joint disease (DJD) of the knee(s) with failure of conservative treatments including lifestyle modifications and rehabilitation exercises. Oral analgesics including NSAIDs have not adequately controlled the patient's symptoms. There is radiographic evidence of Kellgren-Benny grade II (or greater) osteoarthritic (OA) changes, or if lack of radiographic evidence, there is arthroscopic or other evidence of chondrosis of the knee(s).     Injection Procedure  A time out was performed, including verification of patient ID, procedure, site and side, availability of information and equipment, review of safety issues, and agreement with consent, the procedure site was marked.    After time out was performed, the patient was prepped aseptically with chloraprep. A diagnostic and therapeutic injection of 2cc Orthovisc was given under sterile technique using a 22g x 1.5 needle from the anterolateral  aspect of the bilateral Knee Joint in the sitting position.      Lia Amaro had no adverse reactions to the medication. Pain decreased. She was instructed to apply ice to the joint for 20 minutes and avoid strenuous activities for 24-36 hours following the injection. She was warned of  possible blood sugar and/or blood pressure changes during that time. Following that time, she can resume regular activities.    She was reminded to call the clinic immediately for any adverse side effects as explained in clinic today.    RTC to see Grace Owen PA-C in 3 months or as needed.    All of the patient's questions were answered and the patient will contact us if they have any questions or concerns in the interim.

## 2024-02-16 NOTE — PROCEDURES
Large Joint Aspiration/Injection: L knee    Date/Time: 2/16/2024 2:15 PM    Performed by: Grace Owen PA-C  Authorized by: Grace Owen PA-C    Consent Done?:  Yes (Verbal)  Indications:  Pain and arthritis  Site marked: the procedure site was marked    Timeout: prior to procedure the correct patient, procedure, and site was verified    Prep: patient was prepped and draped in usual sterile fashion    Local anesthetic:  Topical anesthetic    Details:  Needle Size:  22 G  Ultrasonic Guidance for needle placement?: No    Approach:  Anterolateral  Location:  Knee  Site:  L knee  Medications:  30 mg OLHS sodium hyaluronate (ORTHOVISC) 30 mg/2 mL IATC injection ([place] formul[place] preferred)  Patient tolerance:  Patient tolerated the procedure well with no immediate complications

## 2024-02-16 NOTE — PROCEDURES
Large Joint Aspiration/Injection: R knee    Date/Time: 2/16/2024 2:15 PM    Performed by: Grace Owen PA-C  Authorized by: Grace Owen PA-C    Consent Done?:  Yes (Verbal)  Indications:  Pain and arthritis  Site marked: the procedure site was marked    Timeout: prior to procedure the correct patient, procedure, and site was verified    Prep: patient was prepped and draped in usual sterile fashion    Local anesthetic:  Topical anesthetic    Details:  Needle Size:  22 G  Ultrasonic Guidance for needle placement?: No    Approach:  Anterolateral  Location:  Knee  Site:  R knee  Medications:  30 mg OLHS sodium hyaluronate (ORTHOVISC) 30 mg/2 mL IATC injection ([place] formul[place] preferred)  Patient tolerance:  Patient tolerated the procedure well with no immediate complications

## 2024-06-27 ENCOUNTER — HOSPITAL ENCOUNTER (EMERGENCY)
Facility: HOSPITAL | Age: 54
Discharge: HOME | End: 2024-06-27
Attending: EMERGENCY MEDICINE
Payer: MEDICAID

## 2024-06-27 VITALS
BODY MASS INDEX: 32.95 KG/M2 | HEART RATE: 43 BPM | WEIGHT: 205 LBS | DIASTOLIC BLOOD PRESSURE: 79 MMHG | HEIGHT: 66 IN | TEMPERATURE: 98 F | OXYGEN SATURATION: 100 % | SYSTOLIC BLOOD PRESSURE: 158 MMHG | RESPIRATION RATE: 18 BRPM

## 2024-06-27 DIAGNOSIS — S99.912A LEFT ANKLE INJURY, INITIAL ENCOUNTER: ICD-10-CM

## 2024-06-27 DIAGNOSIS — S93.402A SPRAIN OF LEFT ANKLE, UNSPECIFIED LIGAMENT, INITIAL ENCOUNTER: Primary | ICD-10-CM

## 2024-06-27 PROCEDURE — 99283 EMERGENCY DEPT VISIT LOW MDM: CPT | Mod: 25

## 2024-06-27 PROCEDURE — 25000003 PHARM REV CODE 250: Performed by: EMERGENCY MEDICINE

## 2024-06-27 RX ORDER — KETOROLAC TROMETHAMINE 10 MG/1
10 TABLET, FILM COATED ORAL
Status: COMPLETED | OUTPATIENT
Start: 2024-06-27 | End: 2024-06-27

## 2024-06-27 RX ORDER — IBUPROFEN 600 MG/1
600 TABLET ORAL EVERY 6 HOURS PRN
Qty: 20 TABLET | Refills: 0 | Status: SHIPPED | OUTPATIENT
Start: 2024-06-27

## 2024-06-27 RX ORDER — METHOCARBAMOL 500 MG/1
500 TABLET, FILM COATED ORAL 3 TIMES DAILY PRN
Qty: 15 TABLET | Refills: 0 | Status: SHIPPED | OUTPATIENT
Start: 2024-06-27 | End: 2024-07-02

## 2024-06-27 RX ADMIN — KETOROLAC TROMETHAMINE 10 MG: 10 TABLET, FILM COATED ORAL at 06:06

## 2024-06-27 NOTE — ED NOTES
Patient identifiers for Lia Amaro 53 y.o. female checked and correct.  Chief Complaint   Patient presents with    Ankle Pain     Pt complaining of pain to her left ankle that started Friday after she slipped on the floor at work     Past Medical History:   Diagnosis Date    Fibromyalgia      Allergies reported:   Review of patient's allergies indicates:   Allergen Reactions    Pcn [penicillins] Hives    Percocet [oxycodone-acetaminophen] Hives and Itching         HEENT: Denies vision changes. Denies ear drainage or hearing loss. No c/o nasal drainage. Denies dysphagia or voice changes.   Appearance: Pt awake, alert & oriented to person, place & time. Pt in no acute distress at present time. Pt is clean and well groomed with clothes appropriately fastened.   Skin: Skin warm, dry & intact. Color consistent with ethnicity. Mucous membranes moist. No breakdown or brusing noted.   Musculoskeletal: Patient moving all extremities well, no obvious swelling or deformities noted. +L ankle pain. CMS intact  Respiratory: Respirations spontaneous, even, and non-labored. Visible chest rise noted. Airway is open and patent. No accessory muscle use noted.   Neurologic: Sensation is intact. Speech is clear and appropriate. Eyes open spontaneously, behavior appropriate to situation, follows commands, facial expression symmetrical, bilateral hand grasp equal and even, purposeful motor response noted.  Cardiac: All peripheral pulses present. No Bilateral lower extremity edema. Cap refill is <3 seconds.  Abdomen: Abdomen soft, non distended, non tender to palpation.   : Pt voids independently, denies dysuria, hematuria, frequency.

## 2024-06-27 NOTE — ED PROVIDER NOTES
Encounter Date: 6/27/2024       History     Chief Complaint   Patient presents with    Ankle Pain     Pt complaining of pain to her left ankle that started Friday after she slipped on the floor at work     HPI  Lia Amaro is a 53-year-old female with a history of fibromyalgia presenting with left ankle pain after a slip injury at work.  [place] Friday causing her pain and swelling to her left ankle.  Her pain is moderate severe, has worsened with ambulation and aggravated by weight-bearing and ambulation.  She reports pain on the medial aspect of her left ankle.  She denies any open fractures, numbness, paresthesias or any other further injuries.  She has not self-treated at home.  She denies any head injury or any other falls or trauma.    Review of patient's allergies indicates:   Allergen Reactions    Pcn [penicillins] Hives    Percocet [oxycodone-acetaminophen] Hives and Itching     Past Medical History:   Diagnosis Date    Fibromyalgia      History reviewed. No pertinent surgical history.  No family history on file.  Social History     Tobacco Use    Smoking status: Never    Smokeless tobacco: Never   Substance Use Topics    Drug use: Never     Review of Systems  All other systems reviewed and were negative; see HPI also for additional ROS.    Physical Exam     Initial Vitals [06/27/24 0613]   BP Pulse Resp Temp SpO2   137/85 69 18 98.2 °F (36.8 °C) 97 %      MAP       --         Physical Exam    Nursing note and vitals reviewed.      Gen/Constitutional: Interactive. No acute distress  Head: Normocephalic, Atraumatic  Neck: supple, no masses or LAD, no JVD  Eyes: PERRLA, conjunctiva clear  Ears, Nose and Throat: No rhinorrhea or stridor.  Cardiac:  Regular rate, Reg Rhythm, No murmur  Pulmonary: CTA Bilat, no wheezes, rhonchi, rales.  No increased work of breathing.  GI: Abdomen soft, non-tender, non-distended; no rebound or guarding  : No CVA  tenderness.  Musculoskeletal: Extremities warm, well perfused, no erythema, no edema  Left ankle:  Tenderness along the medial aspect with slight swelling, no erythema, 2+ distal pulses, sensory intact to light touch, slight pain with weight-bearing.  There is no medial or lateral joint line tenderness.  Skin: No rashes, cyanosis or jaundice.  Neuro: Alert and Oriented x 3; No focal motor or sensory deficits.    Psych: Normal affect      ED Course   Procedures  Labs Reviewed - No data to display         Imaging Results              X-Ray Ankle Complete Left (Final result)  Result time 06/27/24 07:57:12      Final result by Harsh Lubin MD (06/27/24 07:57:12)                   Impression:      No convincing evidence of acute fracture or dislocation.      Electronically signed by: Harsh Lubin  Date:    06/27/2024  Time:    07:57               Narrative:    EXAMINATION:  XR ANKLE COMPLETE 3 VIEW LEFT    CLINICAL HISTORY:  Unspecified injury of left ankle, initial encounter    TECHNIQUE:  AP, lateral and oblique views of the left ankle were performed.    COMPARISON:  None    FINDINGS:  No definite evidence of acute fracture or dislocation.  Talar dome intact.    No evidence of syndesmotic widening.    No large ankle joint effusion.    No evidence of radiopaque foreign body.    Enthesopathic change at the calcaneus.  Degenerative spurring of the dorsal midfoot.                                    X-Rays:   Independently Interpreted Readings:   Other Readings:  X-ray left ankle:  No acute fracture or dislocation    Medications   ketorolac tablet 10 mg (10 mg Oral Given 6/27/24 0654)     Medical Decision Making  Lia Amaro is a 53-year-old female with a history of fibromyalgia presenting with left ankle pain after a slip injury at work.     Differential diagnosis includes:  Sprain, strain, fracture, dislocation, contusion        Amount and/or Complexity of Data Reviewed  Radiology: ordered and independent  interpretation performed.    Risk  Prescription drug management.    Afebrile vital signs stable.  Physical exam findings remarkable for tenderness along the medial ligamentous of her left ankle, worse with weight-bearing and ambulation.  Recent injury.  X-ray obtained shows no concern for fracture dislocation.  Suspect likely sprain or strain, will place in walking boot, rest, ice, compression elevation and anti-inflammatory treatment for short course.  Outpatient follow-up as needed.  Patient neurovascularly intact throughout ED evaluation.  No evidence of open fracture or deformity. Patient agreeable to discharge plan. Strict ED precautions and return instructions discussed at length and patient verbalized understanding. All questions were answered and ample time was given for questions.                                      Clinical Impression:  Final diagnoses:  [S99.912A] Left ankle injury, initial encounter  [S93.402A] Sprain of left ankle, unspecified ligament, initial encounter (Primary)          ED Disposition Condition    Discharge Stable          ED Prescriptions       Medication Sig Dispense Start Date End Date Auth. Provider    ibuprofen (ADVIL,MOTRIN) 600 MG tablet Take 1 tablet (600 mg total) by mouth every 6 (six) hours as needed for Pain. 20 tablet 6/27/2024 -- Aguilar Brizuela DO    methocarbamoL (ROBAXIN) 500 MG Tab Take 1 tablet (500 mg total) by mouth 3 (three) times daily as needed (pain). 15 tablet 6/27/2024 7/2/2024 Aguilar Brizuela DO          Follow-up Information       Follow up With Specialties Details Why Contact [facility]  Schedule an appointment as soon as possible for a visit in 1 week As needed, If symptoms worsen [address] [facility] [address]  [phone]              Aguilar Brizuela DO, FAAEM  Emergency Staff Physician   Dept of Emergency Medicine   [facility]  Spectralink: [id number]        Disclaimer: This note has been generated  using voice-recognition software. There may be typographical errors that have been missed during proof-reading.       Aguilar Brizuela,   06/28/24 0651

## 2024-06-27 NOTE — Clinical Note
"Lia"Elin Amaro was seen and treated in our emergency department on 6/27/2024.  She may return to work on 06/28/2024.       If you have any questions or concerns, please don't hesitate to call.      Aguilar Brizuela, DO"

## 2024-06-27 NOTE — DISCHARGE INSTRUCTIONS
Today, your evaluation shows that you have an ankle sprain, there was no fracture or dislocation seen on your x-ray.  We recommend rest, ice, compression and elevation as needed you may also use Motrin or Tylenol for your symptoms.    Our goal in the emergency department is to always give you outstanding care and exceptional service. You may receive a survey by mail or e-mail in the next week regarding your experience in our ED. We would greatly appreciate your completing and returning the survey. Your feedback provides us with a way to recognize our staff who give very good care and it helps us learn how to improve when your experience was below our aspiration of excellence.

## 2024-07-01 ENCOUNTER — TELEPHONE (OUTPATIENT)
Dept: ADMINISTRATIVE | Facility: CLINIC | Age: 54
End: 2024-07-01
Payer: MEDICAID

## 2024-07-01 NOTE — PROGRESS NOTES
Spoke to patient for Post ED Tracker Assessment. Pt stated when she was in the ED that provider had offered her a work excuse for a week, but she declined to accept at that time and asked for one day. Pt stated she is now needing an extended work excuse and she does not go to see her doctor until next month. I am unable to generate or alter work excuses in Epic and provided patient with hospital number to contact regarding this. Pt denied any other needs at this time. Closing encounter.